# Patient Record
Sex: MALE | Race: OTHER | Employment: FULL TIME | URBAN - METROPOLITAN AREA
[De-identification: names, ages, dates, MRNs, and addresses within clinical notes are randomized per-mention and may not be internally consistent; named-entity substitution may affect disease eponyms.]

---

## 2022-07-10 ENCOUNTER — HOSPITAL ENCOUNTER (INPATIENT)
Age: 42
LOS: 2 days | Discharge: HOME | DRG: 392 | End: 2022-07-12
Attending: EMERGENCY MEDICINE | Admitting: INTERNAL MEDICINE
Payer: COMMERCIAL

## 2022-07-10 ENCOUNTER — APPOINTMENT (OUTPATIENT)
Dept: CT IMAGING | Age: 42
DRG: 392 | End: 2022-07-10
Payer: COMMERCIAL

## 2022-07-10 DIAGNOSIS — K57.20 DIVERTICULITIS OF LARGE INTESTINE WITH PERFORATION WITHOUT ABSCESS OR BLEEDING: Primary | ICD-10-CM

## 2022-07-10 PROBLEM — K57.80 PERFORATED DIVERTICULUM: Status: ACTIVE | Noted: 2022-07-10

## 2022-07-10 LAB
-: ABNORMAL
ABSOLUTE EOS #: 0 K/UL (ref 0–0.4)
ABSOLUTE LYMPH #: 1 K/UL (ref 1–4.8)
ABSOLUTE MONO #: 0.8 K/UL (ref 0.1–1.2)
ALBUMIN SERPL-MCNC: 4.4 G/DL (ref 3.5–5.2)
ALBUMIN/GLOBULIN RATIO: 1.4 (ref 1–2.5)
ALP BLD-CCNC: 80 U/L (ref 40–129)
ALT SERPL-CCNC: 33 U/L (ref 5–41)
ANION GAP SERPL CALCULATED.3IONS-SCNC: 12 MMOL/L (ref 9–17)
AST SERPL-CCNC: 20 U/L
BACTERIA: ABNORMAL
BASOPHILS # BLD: 1 % (ref 0–2)
BASOPHILS ABSOLUTE: 0 K/UL (ref 0–0.2)
BILIRUB SERPL-MCNC: 0.57 MG/DL (ref 0.3–1.2)
BILIRUBIN URINE: NEGATIVE
BUN BLDV-MCNC: 12 MG/DL (ref 6–20)
CALCIUM SERPL-MCNC: 8.7 MG/DL (ref 8.6–10.4)
CHLORIDE BLD-SCNC: 102 MMOL/L (ref 98–107)
CO2: 24 MMOL/L (ref 20–31)
COLOR: YELLOW
CREAT SERPL-MCNC: 0.85 MG/DL (ref 0.7–1.2)
EOSINOPHILS RELATIVE PERCENT: 0 % (ref 1–4)
EPITHELIAL CELLS UA: ABNORMAL /HPF (ref 0–5)
GFR AFRICAN AMERICAN: >60 ML/MIN
GFR NON-AFRICAN AMERICAN: >60 ML/MIN
GFR SERPL CREATININE-BSD FRML MDRD: ABNORMAL ML/MIN/{1.73_M2}
GLUCOSE BLD-MCNC: 159 MG/DL (ref 70–99)
GLUCOSE URINE: NEGATIVE
HCT VFR BLD CALC: 46.1 % (ref 41–53)
HEMOGLOBIN: 16.2 G/DL (ref 13.5–17.5)
KETONES, URINE: NEGATIVE
LACTIC ACID: 1.7 MMOL/L (ref 0.5–2.2)
LEUKOCYTE ESTERASE, URINE: NEGATIVE
LIPASE: 17 U/L (ref 13–60)
LYMPHOCYTES # BLD: 14 % (ref 24–44)
MCH RBC QN AUTO: 31.9 PG (ref 26–34)
MCHC RBC AUTO-ENTMCNC: 35.2 G/DL (ref 31–37)
MCV RBC AUTO: 90.4 FL (ref 80–100)
MONOCYTES # BLD: 10 % (ref 2–11)
MUCUS: ABNORMAL
NITRITE, URINE: NEGATIVE
OTHER OBSERVATIONS UA: ABNORMAL
PDW BLD-RTO: 13.1 % (ref 12.5–15.4)
PH UA: 6 (ref 5–8)
PLATELET # BLD: 160 K/UL (ref 140–450)
PMV BLD AUTO: 8.3 FL (ref 6–12)
POTASSIUM SERPL-SCNC: 3.7 MMOL/L (ref 3.7–5.3)
PROTEIN UA: ABNORMAL
RBC # BLD: 5.1 M/UL (ref 4.5–5.9)
RBC UA: ABNORMAL /HPF (ref 0–2)
SEG NEUTROPHILS: 75 % (ref 36–66)
SEGMENTED NEUTROPHILS ABSOLUTE COUNT: 5.8 K/UL (ref 1.8–7.7)
SODIUM BLD-SCNC: 138 MMOL/L (ref 135–144)
SPECIFIC GRAVITY UA: 1.03 (ref 1–1.03)
TOTAL PROTEIN: 7.5 G/DL (ref 6.4–8.3)
TURBIDITY: CLEAR
URINE HGB: ABNORMAL
UROBILINOGEN, URINE: NORMAL
WBC # BLD: 7.7 K/UL (ref 3.5–11)
WBC UA: ABNORMAL /HPF (ref 0–5)

## 2022-07-10 PROCEDURE — 96375 TX/PRO/DX INJ NEW DRUG ADDON: CPT

## 2022-07-10 PROCEDURE — 83690 ASSAY OF LIPASE: CPT

## 2022-07-10 PROCEDURE — 2580000003 HC RX 258: Performed by: PHYSICIAN ASSISTANT

## 2022-07-10 PROCEDURE — 85025 COMPLETE CBC W/AUTO DIFF WBC: CPT

## 2022-07-10 PROCEDURE — 2500000003 HC RX 250 WO HCPCS: Performed by: PHYSICIAN ASSISTANT

## 2022-07-10 PROCEDURE — 99285 EMERGENCY DEPT VISIT HI MDM: CPT

## 2022-07-10 PROCEDURE — 6360000002 HC RX W HCPCS: Performed by: PHYSICIAN ASSISTANT

## 2022-07-10 PROCEDURE — 6370000000 HC RX 637 (ALT 250 FOR IP): Performed by: NURSE PRACTITIONER

## 2022-07-10 PROCEDURE — 81001 URINALYSIS AUTO W/SCOPE: CPT

## 2022-07-10 PROCEDURE — 83605 ASSAY OF LACTIC ACID: CPT

## 2022-07-10 PROCEDURE — 36415 COLL VENOUS BLD VENIPUNCTURE: CPT

## 2022-07-10 PROCEDURE — 96374 THER/PROPH/DIAG INJ IV PUSH: CPT

## 2022-07-10 PROCEDURE — 2580000003 HC RX 258: Performed by: EMERGENCY MEDICINE

## 2022-07-10 PROCEDURE — 87040 BLOOD CULTURE FOR BACTERIA: CPT

## 2022-07-10 PROCEDURE — 80053 COMPREHEN METABOLIC PANEL: CPT

## 2022-07-10 PROCEDURE — 74177 CT ABD & PELVIS W/CONTRAST: CPT

## 2022-07-10 PROCEDURE — 99222 1ST HOSP IP/OBS MODERATE 55: CPT | Performed by: SURGERY

## 2022-07-10 PROCEDURE — 1210000000 HC MED SURG R&B

## 2022-07-10 PROCEDURE — 6360000004 HC RX CONTRAST MEDICATION: Performed by: EMERGENCY MEDICINE

## 2022-07-10 RX ORDER — METRONIDAZOLE 500 MG/100ML
500 INJECTION, SOLUTION INTRAVENOUS ONCE
Status: COMPLETED | OUTPATIENT
Start: 2022-07-10 | End: 2022-07-10

## 2022-07-10 RX ORDER — PSEUDOEPHEDRINE HCL 30 MG
100 TABLET ORAL 2 TIMES DAILY
Status: ON HOLD | COMMUNITY
End: 2022-07-10

## 2022-07-10 RX ORDER — ONDANSETRON 2 MG/ML
4 INJECTION INTRAMUSCULAR; INTRAVENOUS ONCE
Status: COMPLETED | OUTPATIENT
Start: 2022-07-10 | End: 2022-07-10

## 2022-07-10 RX ORDER — LEVOFLOXACIN 5 MG/ML
750 INJECTION, SOLUTION INTRAVENOUS ONCE
Status: COMPLETED | OUTPATIENT
Start: 2022-07-10 | End: 2022-07-10

## 2022-07-10 RX ORDER — ACETAMINOPHEN 650 MG/1
650 SUPPOSITORY RECTAL EVERY 6 HOURS PRN
Status: DISCONTINUED | OUTPATIENT
Start: 2022-07-10 | End: 2022-07-12 | Stop reason: HOSPADM

## 2022-07-10 RX ORDER — SODIUM CHLORIDE 9 MG/ML
INJECTION, SOLUTION INTRAVENOUS PRN
Status: DISCONTINUED | OUTPATIENT
Start: 2022-07-10 | End: 2022-07-12 | Stop reason: HOSPADM

## 2022-07-10 RX ORDER — ONDANSETRON 4 MG/1
4 TABLET, ORALLY DISINTEGRATING ORAL EVERY 8 HOURS PRN
Status: DISCONTINUED | OUTPATIENT
Start: 2022-07-10 | End: 2022-07-12 | Stop reason: HOSPADM

## 2022-07-10 RX ORDER — SODIUM CHLORIDE 0.9 % (FLUSH) 0.9 %
5-40 SYRINGE (ML) INJECTION EVERY 12 HOURS SCHEDULED
Status: DISCONTINUED | OUTPATIENT
Start: 2022-07-10 | End: 2022-07-12 | Stop reason: HOSPADM

## 2022-07-10 RX ORDER — SODIUM CHLORIDE 0.9 % (FLUSH) 0.9 %
10 SYRINGE (ML) INJECTION PRN
Status: DISCONTINUED | OUTPATIENT
Start: 2022-07-10 | End: 2022-07-12 | Stop reason: HOSPADM

## 2022-07-10 RX ORDER — 0.9 % SODIUM CHLORIDE 0.9 %
80 INTRAVENOUS SOLUTION INTRAVENOUS ONCE
Status: DISCONTINUED | OUTPATIENT
Start: 2022-07-10 | End: 2022-07-12 | Stop reason: HOSPADM

## 2022-07-10 RX ORDER — ONDANSETRON 2 MG/ML
4 INJECTION INTRAMUSCULAR; INTRAVENOUS EVERY 6 HOURS PRN
Status: DISCONTINUED | OUTPATIENT
Start: 2022-07-10 | End: 2022-07-12 | Stop reason: HOSPADM

## 2022-07-10 RX ORDER — POLYETHYLENE GLYCOL 3350 17 G/17G
17 POWDER, FOR SOLUTION ORAL DAILY PRN
Status: DISCONTINUED | OUTPATIENT
Start: 2022-07-10 | End: 2022-07-12 | Stop reason: HOSPADM

## 2022-07-10 RX ORDER — 0.9 % SODIUM CHLORIDE 0.9 %
1000 INTRAVENOUS SOLUTION INTRAVENOUS ONCE
Status: COMPLETED | OUTPATIENT
Start: 2022-07-10 | End: 2022-07-10

## 2022-07-10 RX ORDER — ENOXAPARIN SODIUM 100 MG/ML
40 INJECTION SUBCUTANEOUS DAILY
Status: DISCONTINUED | OUTPATIENT
Start: 2022-07-11 | End: 2022-07-12 | Stop reason: HOSPADM

## 2022-07-10 RX ORDER — ACETAMINOPHEN 325 MG/1
650 TABLET ORAL EVERY 6 HOURS PRN
Status: DISCONTINUED | OUTPATIENT
Start: 2022-07-10 | End: 2022-07-12 | Stop reason: HOSPADM

## 2022-07-10 RX ORDER — SODIUM CHLORIDE 0.9 % (FLUSH) 0.9 %
5-40 SYRINGE (ML) INJECTION PRN
Status: DISCONTINUED | OUTPATIENT
Start: 2022-07-10 | End: 2022-07-12 | Stop reason: HOSPADM

## 2022-07-10 RX ADMIN — SODIUM CHLORIDE, PRESERVATIVE FREE 10 ML: 5 INJECTION INTRAVENOUS at 17:51

## 2022-07-10 RX ADMIN — ONDANSETRON 4 MG: 2 INJECTION INTRAMUSCULAR; INTRAVENOUS at 17:01

## 2022-07-10 RX ADMIN — METRONIDAZOLE 500 MG: 500 INJECTION, SOLUTION INTRAVENOUS at 18:41

## 2022-07-10 RX ADMIN — Medication 80 ML: at 17:51

## 2022-07-10 RX ADMIN — HYDROMORPHONE HYDROCHLORIDE 1 MG: 1 INJECTION, SOLUTION INTRAMUSCULAR; INTRAVENOUS; SUBCUTANEOUS at 17:01

## 2022-07-10 RX ADMIN — HYDROMORPHONE HYDROCHLORIDE 1 MG: 1 INJECTION, SOLUTION INTRAMUSCULAR; INTRAVENOUS; SUBCUTANEOUS at 19:35

## 2022-07-10 RX ADMIN — ACETAMINOPHEN 650 MG: 325 TABLET ORAL at 21:00

## 2022-07-10 RX ADMIN — SODIUM CHLORIDE 1000 ML: 9 INJECTION, SOLUTION INTRAVENOUS at 17:01

## 2022-07-10 RX ADMIN — IOPAMIDOL 75 ML: 755 INJECTION, SOLUTION INTRAVENOUS at 17:51

## 2022-07-10 RX ADMIN — LEVOFLOXACIN 750 MG: 5 INJECTION, SOLUTION INTRAVENOUS at 19:50

## 2022-07-10 ASSESSMENT — PAIN DESCRIPTION - LOCATION
LOCATION: ABDOMEN
LOCATION: ABDOMEN

## 2022-07-10 ASSESSMENT — LIFESTYLE VARIABLES
HOW MANY STANDARD DRINKS CONTAINING ALCOHOL DO YOU HAVE ON A TYPICAL DAY: PATIENT DECLINED
HOW OFTEN DO YOU HAVE A DRINK CONTAINING ALCOHOL: MONTHLY OR LESS

## 2022-07-10 ASSESSMENT — PAIN DESCRIPTION - ORIENTATION
ORIENTATION: LOWER
ORIENTATION: LEFT;LOWER

## 2022-07-10 ASSESSMENT — PAIN - FUNCTIONAL ASSESSMENT
PAIN_FUNCTIONAL_ASSESSMENT: PREVENTS OR INTERFERES SOME ACTIVE ACTIVITIES AND ADLS
PAIN_FUNCTIONAL_ASSESSMENT: 0-10

## 2022-07-10 ASSESSMENT — PAIN SCALES - GENERAL
PAINLEVEL_OUTOF10: 8
PAINLEVEL_OUTOF10: 6
PAINLEVEL_OUTOF10: 10

## 2022-07-10 NOTE — ED PROVIDER NOTES
Emergency Department           COMPLAINT       Chief Complaint   Patient presents with    Abdominal Pain     LLQ pain radiates to rightside, started friday night, vomited in shower that night but now intermittent nausea/vomiting, took tylenol and had full BM firday morning but has had some stool passed, denies blood in stool       PHYSICAL EXAM      ED Triage Vitals [07/10/22 1645]   BP Temp Temp Source Heart Rate Resp SpO2 Height Weight   128/88 98.5 °F (36.9 °C) Oral (!) 108 16 97 % 5' 8\" (1.727 m) 197 lb (89.4 kg)         Constitutional: Alert, oriented x3, nontoxic, answering questions appropriately, acting properly for age, in no acute distress   HEENT: Extraocular muscles intact,  Neck: Trachea midline   Cardiovascular: Regular rhythm and rate no murmurs   Respiratory: Clear to auscultation bilaterally no wheezes, rhonchi, rales, no respiratory distress no tachypnea no retractions no hypoxia  Gastrointestinal: Soft, moderate left lower quadrant tenderness to palpation, nondistended, diminished bowel sounds. No rebound, rigidity, or guarding.    Musculoskeletal: No extremity pain or swelling   Neurologic: Moving all 4 extremities without difficulty there are no gross focal neurologic deficits   Skin: Warm and dry       Physical Exam  DIAGNOSTIC RESULTS     EKG: All EKG's are interpreted by the Emergency Department Physician who either signs or Co-signs this chart in the absence of a cardiologist.    Not indicated unless otherwise documented above or in the midlevel documentation    LABS:  Results for orders placed or performed during the hospital encounter of 07/10/22   CBC with Auto Differential   Result Value Ref Range    WBC 7.7 3.5 - 11.0 k/uL    RBC 5.10 4.5 - 5.9 m/uL    Hemoglobin 16.2 13.5 - 17.5 g/dL    Hematocrit 46.1 41 - 53 %    MCV 90.4 80 - 100 fL    MCH 31.9 26 - 34 pg    MCHC 35.2 31 - 37 g/dL    RDW 13.1 12.5 - 15.4 %    Platelets 140 223 - 231 k/uL    MPV 8.3 6.0 - 12.0 fL    Seg method to exclude a potential uropathogen can be unreliable in many patient populations. Rapid screening tests are less sensitive than culture and if UTI is a clinical possibility, culture should be considered despite a negative urinalysis. Not indicated unless otherwise documented above or in the midlevel documentation    RADIOLOGY:   I reviewedthe radiologist interpretations:  CT ABDOMEN PELVIS W IV CONTRAST Additional Contrast? None   Preliminary Result   Acute diverticulitis of the distal descending/proximal sigmoid colon,   complicated by perforation, with scattered foci of intraperitoneal free air. No well-defined drainable abscess is seen. Hepatic steatosis. Not indicated unless otherwise documented above or in the midlevel documentation    EMERGENCY DEPARTMENT COURSE:       PERTINENT ATTENDING PHYSICIAN COMMENTS:    Sent from urgent care to rule out diverticulitis. Left lower quadrant abdominal pain. No fevers or chills. History of appendectomy. IV fluids and labs. Pain control. CT abdomen and pelvis. 6:15 PM CAT scan shows diverticulitis with perforation scattered foci of intraperitoneal air. No abscess. Will discuss the case with general surgery and admit. We will start IV antibiotics. 6:45 PM evaluated by general surgery will admit. Faculty Attestation    I performed a history and physical examination of the patient and discussed management with the mid level provideer. I reviewed the mid level provider's note and agree with the documented findings and plan of care. Any areas of disagreement are noted on the chart. I was personally present for the key portions of any procedures. I have documented in the chart those procedures where I was not present during the key portions. I have reviewed the emergency nurses triage note.  I agree with the chief complaint, past medical history, past surgical history, allergies, medications, social and family history as documented unless otherwise noted below. Documentation of the HPI, Physical Exam and Medical Decision Making performed by medical students or scribes is based on my personal performance of the HPI, PE and MDM. For Physician Assistant/ Nurse Practitioner cases/documentation I have personally evaluated this patient and have completed at least one if not all key elements of the E/M (history, physical exam, and MDM). Additional findings are as noted.        Herve Collier,   07/10/22 9897

## 2022-07-10 NOTE — CONSULTS
[address]      Patient's Name/ Date of Birth/ Gender: Elvis Sandoval / 1980 (38 y.o.) / male     Referring Physician: Nathan Monroy DO    Consulting Physician: Dr. Feng Para     CC: perforated L colon diverticulitis    History of present Illness: Elvis Sandoval is a 38 y.o. male, presented to ED with worsening LLQ abdominal pain, no prior history of this. CT abd/pelv obtained significant for perforated diverticulitis. [address] colon CA in his father diagnosed age 41, pt has not had colonoscopy yet (scheduled for later this month)    Past Medical History:  has no past medical history on file. Past Surgical History:   Past Surgical History:   Procedure Laterality Date    APPENDECTOMY         Social History:  reports that he has never smoked. He does not have any smokeless tobacco history on file. He reports current alcohol use. He reports that he does not use drugs. Family History: family history is not on file. Review of Systems:   General: Denies fever, chills, night sweats, weight loss, malaise, fatigue  HEENT: Denies sore throat, sinus problems, allergic rhinosinusitis  Card: Denies chest pain, palpitations, orthopnea/PND. Denies h/o murmurs  Pulm: Denies cough, shortness of breath, BRENNAN  GI:  LLQ pain    : Denies polyuria, dysuria, hematuria  Endo: Denies diabetes, thyroid problems. Heme: Denies anemia, h/o bleeding or clotting problems. Neuro: Denies h/o CVA, TIA  Skin: Denies rashes, ulcers  Musculoskeletal: Denies muscle, joint, back pain. Allergies: Patient has no known allergies.     Current Meds:  Current Facility-Administered Medications:     sodium chloride flush 0.9 % injection 10 mL, 10 mL, IntraVENous, PRN, Simon Serna DO, 10 mL at 07/10/22 1751    0.9 % sodium chloride bolus, 80 mL, IntraVENous, Once, Simon Serna DO    metronidazole (FLAGYL) 500 mg in 0.9% NaCl 100 mL IVPB premix, 500 mg, IntraVENous, Once, [employer], PA-C, Last 0. 57   LIPASE 17     Coagulation:   Recent Labs     07/10/22  1654   PROT 7.5         Imaging:  CT ABDOMEN PELVIS W IV CONTRAST Additional Contrast? None    Result Date: 7/10/2022  EXAMINATION: CT OF THE ABDOMEN AND PELVIS WITH CONTRAST 7/10/2022 5:02 pm TECHNIQUE: CT of the abdomen and pelvis was performed with the administration of intravenous contrast. Multiplanar reformatted images are provided for review. Automated exposure control, iterative reconstruction, and/or weight based adjustment of the mA/kV was utilized to reduce the radiation dose to as low as reasonably achievable. COMPARISON: None. HISTORY: ORDERING SYSTEM PROVIDED HISTORY: [facility] pain TECHNOLOGIST PROVIDED HISTORY: Q pain Decision Support Exception - unselect if not a suspected or confirmed emergency medical condition->Emergency Medical Condition (MA) Reason for Exam: LLQ pain x 3 days - emesis Relevant Medical/Surgical History: hx of appendectomy FINDINGS: Lower Chest: Visualized portion of the lower chest demonstrates no acute abnormality. Organs: The liver, gallbladder, spleen, pancreas, adrenal glands, and kidneys demonstrate no acute abnormality. Diffuse low-attenuation of the liver compatible with hepatic steatosis. GI/Bowel: Colonic diverticulosis, with inflammatory changes about the distal descending/proximal sigmoid colon, compatible with acute diverticulitis. This is complicated by perforation, as there is intraperitoneal free air. No well-defined drainable abscess is seen. No bowel obstruction. Prior appendectomy per provided clinical history. Pelvis: Urinary bladder is unremarkable. Peritoneum/Retroperitoneum: No lymphadenopathy is seen. There are scattered foci of intraperitoneal free air. No significant free fluid is noted. Bones/Soft Tissues: No acute bony abnormality is seen. Acute diverticulitis of the distal descending/proximal sigmoid colon, complicated by perforation, with scattered foci of intraperitoneal free air. No well-defined drainable abscess is seen. Hepatic steatosis. Assessment:    Bunny Monroe is a 38 y.o. male with     Acute perforated L colon diverticulitis    Plan:    1. Discussed with patient, recommend antibiotics and bowel rest at this point, ok for ice chips for comfort and sips with meds. If continues to improve then we can discuss outpt colonoscopy and details of elective colectomy. We also discussed the possibility of emergency colectomy and colostomy if failure to improve on antibiotics. All questions were answered, pt is agreeable to this plan.   2. Discussed with ED staff      Sofia Bai DO  7/10/2022

## 2022-07-10 NOTE — ED PROVIDER NOTES
[id number] [facility]  [address] [facility] [place] [address]  Phone: [phone]  Fax: [phone] [facility]      Pt Name: Tadeo Contreras  MRN: [id number]  Shelley 1980  Date of evaluation: 7/10/22      CHIEF COMPLAINT:  Chief Complaint   Patient presents with    Abdominal Pain     LLQ pain radiates to rightside, started friday night, vomited in shower that night but now intermittent nausea/vomiting, took tylenol and had full BM firday morning but has had some stool passed, denies blood in stool        Sera Saleh [address] Ponce is a 38 y.o. male who presents with GI complaints:       Timing/Onset: Patient here for evaluation of significant left lower quadrant pain and just left lower abdominal pain in general is been going on since Friday. Initially this was associated with some vomiting which is only been intermittent since that time with attempted oral intake of solid foods. Patient's had no vomiting today and he has been able to tolerate some fluids. He states he did have a fever of 100.4 yesterday. Patient took some Motrin earlier today about 11:00. No associated trauma or any chest/respiratory/back/urinary pain. There is no radiation of pain to his chest back or genitals/groin. Patient states that he did have a bowel movement on Friday which was a little smaller and harder in nature. He has had no other bowel movements since that time. He has had no hematuria. Remote history of appendectomy. He denies any history of kidney stones or any other urology/renal history. Context/Setting: As above    Quality: sharp   achy    Duration:  Constant   Modifying Factors:  Post-prandial      Severity: moderate        Nursing Notes were reviewed. REVIEW OF SYSTEMS       Constitutional: per HPI  Eyes: No visual changes. Neck: No neck pain. Respiratory: Denies recent shortness of breath.     Cardiac:  Denies recent chest pain or palpitations  GI: Per HPI  : Per HPI    Musculoskeletal: per HPI   Neurologic: Denies headache or focal weakness. Skin:  Denies any rash. Negative in 10 essential Systems except as mentioned above and in the HPI. PAST MEDICAL HISTORY   PMH:  has no past medical history on file. Surgical History:  has a past surgical history that includes Appendectomy. Social History:  reports that he has never smoked. He does not have any smokeless tobacco history on file. He reports current alcohol use. He reports that he does not use drugs. Family History: None  Psychiatric History: None    Allergies:has No Known Allergies. PHYSICAL EXAM     INITIAL VITALS: /82   Pulse 89   Temp 98.5 °F (36.9 °C) (Oral)   Resp 16   Ht 5' 8\" (1.727 m)   Wt 89.4 kg (197 lb)   SpO2 96%   BMI 29.95 kg/m²   Constitutional:  Well developed, nontoxic but pain distress/malase. Eyes:  Pupils equal/round  HENT:  Atraumatic, external ears normal, nose normal, oropharynx moist.   Respiratory:  Clear to auscultation bilaterally with good air exchange, no W/R/R  Cardiovascular:  RRR with normal S1 and S2  Gastrointestinal/Abdomen:  Soft, significant LLQ > midline/right lower abdominal tenderness to palpation with some guarding. No overt signs of generalized peritonitis. hypoactive BS present. No mass appreciated. Musculoskeletal:  Normal to inspection  Back:  No midline/paraspinal TTP. No bilat CVA/flank TTP. Integument:  No rash.     Neurologic:  Alert & age appropriate mentation-interaction      DIAGNOSTIC RESULTS     EKG: All EKG's are interpreted by the Emergency Department Physician who either signs or Co-signs this chart in the absence of a cardiologist.    Not indicated    RADIOLOGY:   Reviewed the radiologist:  CT ABDOMEN PELVIS W IV CONTRAST Additional Contrast? None   Preliminary Result   Acute diverticulitis of the distal descending/proximal sigmoid colon,   complicated by perforation, with scattered foci of intraperitoneal free air. No well-defined drainable abscess is seen. Hepatic steatosis. LABS:  Labs Reviewed   CBC WITH AUTO DIFFERENTIAL - Abnormal; Notable for the following components:       Result Value    Seg Neutrophils 75 (*)     Lymphocytes 14 (*)     Eosinophils % 0 (*)     All other components within normal limits   COMPREHENSIVE METABOLIC PANEL W/ REFLEX TO MG FOR LOW K - Abnormal; Notable for the following components:    Glucose 159 (*)     All other components within normal limits   URINALYSIS WITH REFLEX TO CULTURE - Abnormal; Notable for the following components:    Urine Hgb TRACE (*)     Protein, UA TRACE (*)     All other components within normal limits   MICROSCOPIC URINALYSIS - Abnormal; Notable for the following components:    Bacteria, UA FEW (*)     Mucus, UA 2+ (*)     Other Observations UA   (*)     Value: Utilizing a urinalysis as the only screening method to exclude a potential uropathogen can be unreliable in many patient populations. Rapid screening tests are less sensitive than culture and if UTI is a clinical possibility, culture should be considered despite a negative urinalysis. All other components within normal limits   CULTURE, BLOOD 1   CULTURE, BLOOD 1   LIPASE   LACTIC ACID         EMERGENCY DEPARTMENT COURSE, DDX and MDM:     1700  CT and abd labs ordered. Pt is nontoxic but he has significant tenderness of lower abdomen and exhibiting some pain distress. IVF/Dilaudid/Zofran ordered. 1819  Cultures and IV abx ordered. Dr Echavarria/Gen Surg paged with CT results of perforated diverticulitis. 1944  [facility], discussed with JORGE Rasmussen/STEVO. Dr Renee Latham evaluated pt here and nonsurgical plan at this time.      Orders Placed This Encounter   Medications    HYDROmorphone (DILAUDID) injection 1 mg    ondansetron (ZOFRAN) injection 4 mg    0.9 % sodium chloride bolus    sodium chloride flush 0.9 % injection 10 mL    0.9 % sodium chloride bolus    iopamidol (ISOVUE-370) 76 % injection 75 mL    DISCONTD: piperacillin-tazobactam (ZOSYN) 3,375 mg in dextrose 5 % 50 mL IVPB (mini-bag)     Order Specific Question:   Antimicrobial Indications     Answer:   Intra-Abdominal Infection    metronidazole (FLAGYL) 500 mg in 0.9% NaCl 100 mL IVPB premix     Order Specific Question:   Antimicrobial Indications     Answer:   Intra-Abdominal Infection    levoFLOXacin (LEVAQUIN) 750 MG/150ML infusion 750 mg     Order Specific Question:   Antimicrobial Indications     Answer:   Intra-Abdominal Infection    HYDROmorphone (DILAUDID) injection 1 mg       CONSULTS:  IP CONSULT TO GENERAL SURGERY      FINAL IMPRESSION      1. Diverticulitis of large intestine with perforation without abscess or bleeding          DISPOSITION/PLAN:  DISPOSITION Admitted 07/10/2022 07:39:19 PM        PATIENT REFERRED TO:  No follow-up provider specified.     DISCHARGE MEDICATIONS:  New Prescriptions    No medications on file       (Please note that portions of this note were completed with a voice recognition program.  Efforts were made to edit the dictations but occasionally words are mis-transcribed.)    WALI Thomas PA-C  07/10/22 1946

## 2022-07-10 NOTE — LETTER
[facility] [address] ICU  [address] [facility] [address]  Phone: [phone]    No name on file. July 12, 2022     Patient: Danielle Watters   YOB: 1980   Date of Visit: 7/10/2022       To Whom It May Concern: It is my medical opinion that Luz Elena Adler may return to work on 7/13/22. He was seen at this facility from 7/10/22-7/12/22    If you have any questions or concerns, please don't hesitate to call.     Sincerely,        Jose Aquino RN

## 2022-07-11 LAB
ABSOLUTE EOS #: 0 K/UL (ref 0–0.4)
ABSOLUTE LYMPH #: 1.4 K/UL (ref 1–4.8)
ABSOLUTE MONO #: 0.9 K/UL (ref 0.1–1.2)
ANION GAP SERPL CALCULATED.3IONS-SCNC: 9 MMOL/L (ref 9–17)
BASOPHILS # BLD: 0 % (ref 0–2)
BASOPHILS ABSOLUTE: 0 K/UL (ref 0–0.2)
BUN BLDV-MCNC: 11 MG/DL (ref 6–20)
CALCIUM SERPL-MCNC: 8.5 MG/DL (ref 8.6–10.4)
CHLORIDE BLD-SCNC: 104 MMOL/L (ref 98–107)
CO2: 27 MMOL/L (ref 20–31)
CREAT SERPL-MCNC: 0.84 MG/DL (ref 0.7–1.2)
EOSINOPHILS RELATIVE PERCENT: 0 % (ref 1–4)
GFR AFRICAN AMERICAN: >60 ML/MIN
GFR NON-AFRICAN AMERICAN: >60 ML/MIN
GFR SERPL CREATININE-BSD FRML MDRD: ABNORMAL ML/MIN/{1.73_M2}
GLUCOSE BLD-MCNC: 103 MG/DL (ref 70–99)
HCT VFR BLD CALC: 43.2 % (ref 41–53)
HEMOGLOBIN: 15 G/DL (ref 13.5–17.5)
LYMPHOCYTES # BLD: 21 % (ref 24–44)
MCH RBC QN AUTO: 31.6 PG (ref 26–34)
MCHC RBC AUTO-ENTMCNC: 34.7 G/DL (ref 31–37)
MCV RBC AUTO: 91 FL (ref 80–100)
MONOCYTES # BLD: 13 % (ref 2–11)
PDW BLD-RTO: 12.9 % (ref 12.5–15.4)
PLATELET # BLD: 139 K/UL (ref 140–450)
PMV BLD AUTO: 8.2 FL (ref 6–12)
POTASSIUM SERPL-SCNC: 3.8 MMOL/L (ref 3.7–5.3)
RBC # BLD: 4.75 M/UL (ref 4.5–5.9)
SEG NEUTROPHILS: 66 % (ref 36–66)
SEGMENTED NEUTROPHILS ABSOLUTE COUNT: 4.5 K/UL (ref 1.8–7.7)
SODIUM BLD-SCNC: 140 MMOL/L (ref 135–144)
WBC # BLD: 6.9 K/UL (ref 3.5–11)

## 2022-07-11 PROCEDURE — 85025 COMPLETE CBC W/AUTO DIFF WBC: CPT

## 2022-07-11 PROCEDURE — 2580000003 HC RX 258: Performed by: HOSPITALIST

## 2022-07-11 PROCEDURE — 80048 BASIC METABOLIC PNL TOTAL CA: CPT

## 2022-07-11 PROCEDURE — 1210000000 HC MED SURG R&B

## 2022-07-11 PROCEDURE — 99232 SBSQ HOSP IP/OBS MODERATE 35: CPT | Performed by: HOSPITALIST

## 2022-07-11 PROCEDURE — 6360000002 HC RX W HCPCS: Performed by: NURSE PRACTITIONER

## 2022-07-11 PROCEDURE — 2500000003 HC RX 250 WO HCPCS: Performed by: HOSPITALIST

## 2022-07-11 PROCEDURE — 2580000003 HC RX 258: Performed by: NURSE PRACTITIONER

## 2022-07-11 PROCEDURE — 36415 COLL VENOUS BLD VENIPUNCTURE: CPT

## 2022-07-11 PROCEDURE — 6360000002 HC RX W HCPCS: Performed by: HOSPITALIST

## 2022-07-11 RX ORDER — LEVOFLOXACIN 5 MG/ML
750 INJECTION, SOLUTION INTRAVENOUS EVERY 24 HOURS
Status: DISCONTINUED | OUTPATIENT
Start: 2022-07-11 | End: 2022-07-12 | Stop reason: HOSPADM

## 2022-07-11 RX ORDER — METRONIDAZOLE 500 MG/100ML
500 INJECTION, SOLUTION INTRAVENOUS EVERY 8 HOURS
Status: DISCONTINUED | OUTPATIENT
Start: 2022-07-11 | End: 2022-07-12 | Stop reason: HOSPADM

## 2022-07-11 RX ORDER — SODIUM CHLORIDE 9 MG/ML
INJECTION, SOLUTION INTRAVENOUS CONTINUOUS
Status: DISCONTINUED | OUTPATIENT
Start: 2022-07-11 | End: 2022-07-12 | Stop reason: HOSPADM

## 2022-07-11 RX ADMIN — HYDROMORPHONE HYDROCHLORIDE 1 MG: 1 INJECTION, SOLUTION INTRAMUSCULAR; INTRAVENOUS; SUBCUTANEOUS at 05:38

## 2022-07-11 RX ADMIN — METRONIDAZOLE 500 MG: 500 INJECTION, SOLUTION INTRAVENOUS at 09:07

## 2022-07-11 RX ADMIN — METRONIDAZOLE 500 MG: 500 INJECTION, SOLUTION INTRAVENOUS at 17:16

## 2022-07-11 RX ADMIN — SODIUM CHLORIDE: 9 INJECTION, SOLUTION INTRAVENOUS at 09:05

## 2022-07-11 RX ADMIN — HYDROMORPHONE HYDROCHLORIDE 1 MG: 1 INJECTION, SOLUTION INTRAMUSCULAR; INTRAVENOUS; SUBCUTANEOUS at 11:42

## 2022-07-11 RX ADMIN — HYDROMORPHONE HYDROCHLORIDE 1 MG: 1 INJECTION, SOLUTION INTRAMUSCULAR; INTRAVENOUS; SUBCUTANEOUS at 23:15

## 2022-07-11 RX ADMIN — HYDROMORPHONE HYDROCHLORIDE 1 MG: 1 INJECTION, SOLUTION INTRAMUSCULAR; INTRAVENOUS; SUBCUTANEOUS at 18:16

## 2022-07-11 RX ADMIN — SODIUM CHLORIDE, PRESERVATIVE FREE 10 ML: 5 INJECTION INTRAVENOUS at 09:05

## 2022-07-11 RX ADMIN — HYDROMORPHONE HYDROCHLORIDE 1 MG: 1 INJECTION, SOLUTION INTRAMUSCULAR; INTRAVENOUS; SUBCUTANEOUS at 00:08

## 2022-07-11 RX ADMIN — LEVOFLOXACIN 750 MG: 5 INJECTION, SOLUTION INTRAVENOUS at 18:22

## 2022-07-11 ASSESSMENT — PAIN SCALES - GENERAL
PAINLEVEL_OUTOF10: 5
PAINLEVEL_OUTOF10: 9
PAINLEVEL_OUTOF10: 8
PAINLEVEL_OUTOF10: 4
PAINLEVEL_OUTOF10: 8
PAINLEVEL_OUTOF10: 8
PAINLEVEL_OUTOF10: 9

## 2022-07-11 ASSESSMENT — PAIN DESCRIPTION - DESCRIPTORS
DESCRIPTORS: DISCOMFORT
DESCRIPTORS: STABBING

## 2022-07-11 ASSESSMENT — PAIN DESCRIPTION - ORIENTATION
ORIENTATION: LEFT

## 2022-07-11 ASSESSMENT — PAIN DESCRIPTION - LOCATION
LOCATION: ABDOMEN

## 2022-07-11 ASSESSMENT — PAIN DESCRIPTION - PAIN TYPE: TYPE: ACUTE PAIN

## 2022-07-11 NOTE — H&P
[facility]  Office: [address], DO, Izabel Burr, DO, Dustin Aviles, DO, Jaja Best, DO, Artemio Napier MD, Mai Olivares MD, Lakisha Israel MD, Anthony Bishop MD, Ashley Alex MD, Paramjit Yañez MD, Cali Cabello MD, Delma Draper, DO, Yi Corey MD,  George Duran MD, Magdalene Elizalde MD, Nereida Drew DO, Aris Bui MD, Sara Barnett MD, Dorina Lawrence DO, Anders Collier MD, Prakash Rubi MD, Adela Britt, [facility], [facility], CNP, Corinne Dennis, CNP, Cecilia Galaviz, CNP, Horace Escamilla, [facility], Isaias Hurst, [facility], Isamar Villanueva PAEnriquetaC, Rhina Nelson, DNP, Alejandro Aguiar, CNP, Ct Greenberg, CNP, Wilbert Angeles, CNP, Salvatore Garcia, CNS, Farshad Lanza, [facility], Juancarlos Max, CNP, Mayra Magdaleno, CNP, Lizandro Granados, [facility]         [address] [facility]    HISTORY AND PHYSICAL EXAMINATION            Date:   7/11/2022  Patient name:  Alberto Montanez  Date of admission:  7/10/2022  4:38 PM  MRN:   [id number]  Account:  [de-identified]  YOB: 1980  PCP:    Lia Leiva MD  Room:   [address] [place] [address]  Code Status:    Full Code    Chief Complaint:     Chief Complaint   Patient presents with    Abdominal Pain     LLQ pain radiates to rightside, started friday night, vomited in shower that night but now intermittent nausea/vomiting, took tylenol and had full BM firday morning but has had some stool passed, denies blood in stool      Patient seen in follow-up for abdominal pain secondary to acute diverticulitis with perforation, patient states \"my belly hurts but I feel better\"    History Obtained From:     patient, electronic medical record    History of Present Illness:     Alberto Montanez is a 38 y.o.   /  male who presents with Abdominal Pain (LLQ pain radiates to rightside, started friday night, vomited in shower that night but now intermittent nausea/vomiting, took tylenol and had full BM firday morning but has had some stool passed, denies blood in stool )   and is admitted to the hospital for the management of Perforated diverticulum. This very pleasant 70-year-old male presented to the hospital with abdominal pain. He originally developed abdominal pain late Friday evening. He felt that he simply had some sort of gastroenteritis. He did have abdominal pain, fevers along with nausea and vomiting. This symptoms wax and waned and he did not immediately seek medical treatment. It progressively got worse and he presented to the hospital yesterday evening where imaging studies have shown an acute diverticulitis with perforation. At this point in time he has been admitted and initiated on IV antibiotics. He received his first dose of Levaquin and Flagyl in the emergency room. I have reordered these medications accordingly. His pain is under control with Dilaudid. I am going to start him on IV fluids as he is n.p.o. and I will allow him ice chips. He has met with surgery and he understands that ultimately once his acute illness has improved and the bowel has \"cooled off\" he will require colonoscopy and on the elective laparoscopic resection of the affected bowel. Multiple questions answered regarding this. He denies any questions or concerns at this point in time. Past Medical History:     History reviewed. No pertinent past medical history. Past Surgical History:     Past Surgical History:   Procedure Laterality Date    APPENDECTOMY          Medications Prior to Admission:     Prior to Admission medications    Not on File        Allergies:     Patient has no known allergies. Social History:     Tobacco:    reports that he has never smoked. He has never used smokeless tobacco.  Alcohol:      reports current alcohol use. Drug Use:  reports no history of drug use.     Family History:     Family History   Family history unknown: Yes       Review of Systems:     Positive and Negative as described in HPI. CONSTITUTIONAL: For fevers  HEENT:  negative for vision, hearing changes, runny nose, throat pain  RESPIRATORY:  negative for shortness of breath, cough, congestion, wheezing  CARDIOVASCULAR:  negative for chest pain, palpitations  GASTROINTESTINAL: Positive for nausea, vomiting, abdominal pain  GENITOURINARY:  negative for difficulty of urination, burning with urination, frequency   INTEGUMENT:  negative for rash, skin lesions, easy bruising   HEMATOLOGIC/LYMPHATIC:  negative for swelling/edema   ALLERGIC/IMMUNOLOGIC:  negative for urticaria , itching  ENDOCRINE:  negative increase in drinking, increase in urination, hot or cold intolerance  MUSCULOSKELETAL:  negative joint pains, muscle aches, swelling of joints  NEUROLOGICAL:  negative for headaches, dizziness, lightheadedness, numbness, pain, tingling extremities  BEHAVIOR/PSYCH:  negative for depression, anxiety    Physical Exam:   /65   Pulse 78   Temp 98.4 °F (36.9 °C) (Oral)   Resp 14   Ht 5' 8\" (1.727 m)   Wt 199 lb 15.3 oz (90.7 kg)   SpO2 95%   BMI 30.40 kg/m²   Temp (24hrs), Av.9 °F (37.2 °C), Min:98.4 °F (36.9 °C), Max:100.6 °F (38.1 °C)    No results for input(s): POCGLU in the last 72 hours.   No intake or output data in the 24 hours ending 22 0912    General Appearance:  alert, well appearing, and in no acute distress  Mental status: oriented to person, place, and time  Head:  normocephalic, atraumatic  Eye: no icterus, redness, pupils equal and reactive, extraocular eye movements intact, conjunctiva clear  Ear: normal external ear, no discharge, hearing intact  Nose:  no drainage noted  Mouth: mucous membranes moist  Neck: supple, no carotid bruits, thyroid not palpable  Lungs: Bilateral equal air entry, clear to ausculation, no wheezing, rales or rhonchi, normal effort  Cardiovascular: normal rate, regular rhythm, no murmur, gallop, rub  Abdomen: Soft, tenderness in the left lower quadrant, nondistended, normal bowel sounds, no hepatomegaly or splenomegaly  Neurologic: There are no new focal motor or sensory deficits, normal muscle tone and bulk, no abnormal sensation, normal speech, cranial nerves II through XII grossly intact  Skin: No gross lesions, rashes, bruising or bleeding on exposed skin area  Extremities:  peripheral pulses palpable, no pedal edema or calf pain with palpation  Psych: normal affect     Investigations:      Laboratory Testing:  Recent Results (from the past 24 hour(s))   CBC with Auto Differential    Collection Time: 07/10/22  4:54 PM   Result Value Ref Range    WBC 7.7 3.5 - 11.0 k/uL    RBC 5.10 4.5 - 5.9 m/uL    Hemoglobin 16.2 13.5 - 17.5 g/dL    Hematocrit 46.1 41 - 53 %    MCV 90.4 80 - 100 fL    MCH 31.9 26 - 34 pg    MCHC 35.2 31 - 37 g/dL    RDW 13.1 12.5 - 15.4 %    Platelets 478 321 - 552 k/uL    MPV 8.3 6.0 - 12.0 fL    Seg Neutrophils 75 (H) 36 - 66 %    Lymphocytes 14 (L) 24 - 44 %    Monocytes 10 2 - 11 %    Eosinophils % 0 (L) 1 - 4 %    Basophils 1 0 - 2 %    Segs Absolute 5.80 1.8 - 7.7 k/uL    Absolute Lymph # 1.00 1.0 - 4.8 k/uL    Absolute Mono # 0.80 0.1 - 1.2 k/uL    Absolute Eos # 0.00 0.0 - 0.4 k/uL    Basophils Absolute 0.00 0.0 - 0.2 k/uL   Comprehensive Metabolic Panel w/ Reflex to MG    Collection Time: 07/10/22  4:54 PM   Result Value Ref Range    Glucose 159 (H) 70 - 99 mg/dL    BUN 12 6 - 20 mg/dL    CREATININE 0.85 0.70 - 1.20 mg/dL    Calcium 8.7 8.6 - 10.4 mg/dL    Sodium 138 135 - 144 mmol/L    Potassium 3.7 3.7 - 5.3 mmol/L    Chloride 102 98 - 107 mmol/L    CO2 24 20 - 31 mmol/L    Anion Gap 12 9 - 17 mmol/L    Alkaline Phosphatase 80 40 - 129 U/L    ALT 33 5 - 41 U/L    AST 20 <40 U/L    Total Bilirubin 0.57 0.3 - 1.2 mg/dL    Total Protein 7.5 6.4 - 8.3 g/dL    Albumin 4.4 3.5 - 5.2 g/dL    Albumin/Globulin Ratio 1.4 1.0 - 2.5    GFR Non-African American >60 >60 mL/min    GFR African American >60 >60 mL/min    GFR Comment         Lipase Collection Time: 07/10/22  4:54 PM   Result Value Ref Range    Lipase 17 13 - 60 U/L   Lactic Acid    Collection Time: 07/10/22  4:54 PM   Result Value Ref Range    Lactic Acid 1.7 0.5 - 2.2 mmol/L   Urinalysis with Reflex to Culture    Collection Time: 07/10/22  5:40 PM    Specimen: Urine   Result Value Ref Range    Color, UA Yellow Yellow    Turbidity UA Clear Clear    Glucose, Ur NEGATIVE NEGATIVE    Bilirubin Urine NEGATIVE NEGATIVE    Ketones, Urine NEGATIVE NEGATIVE    Specific Gravity, UA 1.030 1.005 - 1.030    Urine Hgb TRACE (A) NEGATIVE    pH, UA 6.0 5.0 - 8.0    Protein, UA TRACE (A) NEGATIVE    Urobilinogen, Urine Normal Normal    Nitrite, Urine NEGATIVE NEGATIVE    Leukocyte Esterase, Urine NEGATIVE NEGATIVE   Microscopic Urinalysis    Collection Time: 07/10/22  5:40 PM   Result Value Ref Range    -          WBC, UA 5 TO 10 0 - 5 /HPF    RBC, UA 5 TO 10 0 - 2 /HPF    Epithelial Cells UA 5 TO 10 0 - 5 /HPF    Bacteria, UA FEW (A) None    Mucus, UA 2+ (A) None    Other Observations UA (A) NOT REQ. Utilizing a urinalysis as the only screening method to exclude a potential uropathogen can be unreliable in many patient populations. Rapid screening tests are less sensitive than culture and if UTI is a clinical possibility, culture should be considered despite a negative urinalysis. Culture, Blood 1    Collection Time: 07/10/22  6:02 PM    Specimen: Blood   Result Value Ref Range    Specimen Description . BLOOD     Special Requests L AC GREEN 7ML ORANGE 7ML     Culture NO GROWTH 12 HOURS    Culture, Blood 1    Collection Time: 07/10/22  6:22 PM    Specimen: Blood   Result Value Ref Range    Specimen Description . BLOOD     Special Requests L AC ORANGE 6ML GREEN 7ML     Culture NO GROWTH 12 HOURS    Basic Metabolic Panel w/ Reflex to MG    Collection Time: 07/11/22  5:33 AM   Result Value Ref Range    Glucose 103 (H) 70 - 99 mg/dL    BUN 11 6 - 20 mg/dL    CREATININE 0.84 0.70 - 1.20 mg/dL    Calcium 8.5 (L) 8.6 - 10.4 mg/dL    Sodium 140 135 - 144 mmol/L    Potassium 3.8 3.7 - 5.3 mmol/L    Chloride 104 98 - 107 mmol/L    CO2 27 20 - 31 mmol/L    Anion Gap 9 9 - 17 mmol/L    GFR Non-African American >60 >60 mL/min    GFR African American >60 >60 mL/min    GFR Comment         CBC with Auto Differential    Collection Time: 07/11/22  5:33 AM   Result Value Ref Range    WBC 6.9 3.5 - 11.0 k/uL    RBC 4.75 4.5 - 5.9 m/uL    Hemoglobin 15.0 13.5 - 17.5 g/dL    Hematocrit 43.2 41 - 53 %    MCV 91.0 80 - 100 fL    MCH 31.6 26 - 34 pg    MCHC 34.7 31 - 37 g/dL    RDW 12.9 12.5 - 15.4 %    Platelets 460 (L) 323 - 450 k/uL    MPV 8.2 6.0 - 12.0 fL    Seg Neutrophils 66 36 - 66 %    Lymphocytes 21 (L) 24 - 44 %    Monocytes 13 (H) 2 - 11 %    Eosinophils % 0 (L) 1 - 4 %    Basophils 0 0 - 2 %    Segs Absolute 4.50 1.8 - 7.7 k/uL    Absolute Lymph # 1.40 1.0 - 4.8 k/uL    Absolute Mono # 0.90 0.1 - 1.2 k/uL    Absolute Eos # 0.00 0.0 - 0.4 k/uL    Basophils Absolute 0.00 0.0 - 0.2 k/uL       Imaging/Diagnostics:  CT ABDOMEN PELVIS W IV CONTRAST Additional Contrast? None    Result Date: 7/10/2022  Acute diverticulitis of the distal descending/proximal sigmoid colon, complicated by perforation, with scattered foci of intraperitoneal free air. No well-defined drainable abscess is seen. Hepatic steatosis. Assessment :      Hospital Problems           Last Modified POA    * (Principal) Perforated diverticulum 7/10/2022 Yes          Plan:     Patient status inpatient in the Progressive Unit/Step down    1. Acute diverticulitis with perforation  1. IV fluids  2. Antibiotics  3. Pain control  4. Okay for ice chips  5. Antiemetics  6.  Patient understands that if necessary should his symptoms acutely worsen he may require surgical intervention however goal is to allow his acute illness to resolve and undergo colonoscopy and elective resection in the future    Consultations:   [facility]    Patient is admitted

## 2022-07-11 NOTE — PROGRESS NOTES
[address] [facility] General Surgery Progress Note            PATIENT NAME: Tadeo Contreras     TODAY'S DATE: 2022, 4:08 PM    Chief Complaint   Patient presents with    Abdominal Pain     LLQ pain radiates to rightside, started friday night, vomited in shower that night but now intermittent nausea/vomiting, took tylenol and had full BM  morning but has had some stool passed, denies blood in stool        SUBJECTIVE:    Pt seen and examined. No acute events overnight. Abd pain somewhat improved, passing flatus no BM. No other complaints. OBJECTIVE:   Vitals:  /70   Pulse 81   Temp 98.2 °F (36.8 °C) (Oral)   Resp 14   Ht 5' 8\" (1.727 m)   Wt 199 lb 15.3 oz (90.7 kg)   SpO2 96%   BMI 30.40 kg/m²    TEMPERATURE:  Current - Temp: 98.2 °F (36.8 °C); Max - Temp  Av.8 °F (37.1 °C)  Min: 98.2 °F (36.8 °C)  Max: 100.6 °F (38.1 °C)    INTAKE/OUTPUT:    No intake or output data in the 24 hours ending 22 1608              General: AOx3, NAD  Lungs: Symmetrical chest rise bilaterally, no audible wheezes or rhonchi  Heart: S1S2  Abdomen: Soft, stable tenderness of the LLQ  Extremity: moves all extremities x4, No edema      Data Review:  CBC:   Recent Labs     07/10/22  1654 22  0533   WBC 7.7 6.9   HGB 16.2 15.0    139*     BMP:    Recent Labs     07/10/22  1654 22  0533    140   K 3.7 3.8    104   CO2 24 27   BUN 12 11   CREATININE 0.85 0.84   GLUCOSE 159* 103*     Hepatic:   Recent Labs     07/10/22  1654   AST 20   ALT 33   ALKPHOS 80   BILITOT 0.57     Coagulation:   Recent Labs     07/10/22  1654   PROT 7.5             ASSESSMENT     Patient Active Problem List   Diagnosis    Perforated diverticulum         PLAN    1. Trial CLD today, monitor abdominal pain.    2. Supportive care    Electronically signed by Yisel Espinal DO on 2022 at 4:09 PM

## 2022-07-11 NOTE — CARE COORDINATION
07/11/22 1514   Service Assessment   Patient Orientation Alert and Oriented   Cognition Alert   History Provided By Patient   Primary [address] [address] is: Legal Next of Kin   Prior Functional Level Independent in ADLs/IADLs   Current Functional Level Independent in ADLs/IADLs   Social/Functional History   Type of Home House   ADL Assistance Independent   Homemaking Assistance Independent   Ambulation Assistance Independent   Transfer Assistance Independent   Active  Yes   Discharge Planning   Type of [address] Prior To Admission None   DME Ordered?  No   Type of Home Care Services None   Patient expects to be discharged to: House     D/c home independent

## 2022-07-12 VITALS
RESPIRATION RATE: 16 BRPM | HEART RATE: 67 BPM | SYSTOLIC BLOOD PRESSURE: 117 MMHG | BODY MASS INDEX: 29.74 KG/M2 | DIASTOLIC BLOOD PRESSURE: 83 MMHG | TEMPERATURE: 97.2 F | HEIGHT: 68 IN | WEIGHT: 196.21 LBS | OXYGEN SATURATION: 98 %

## 2022-07-12 PROCEDURE — 99232 SBSQ HOSP IP/OBS MODERATE 35: CPT | Performed by: INTERNAL MEDICINE

## 2022-07-12 PROCEDURE — 2580000003 HC RX 258: Performed by: NURSE PRACTITIONER

## 2022-07-12 PROCEDURE — 6360000002 HC RX W HCPCS: Performed by: NURSE PRACTITIONER

## 2022-07-12 PROCEDURE — 2500000003 HC RX 250 WO HCPCS: Performed by: HOSPITALIST

## 2022-07-12 PROCEDURE — 93005 ELECTROCARDIOGRAM TRACING: CPT | Performed by: INTERNAL MEDICINE

## 2022-07-12 PROCEDURE — 2580000003 HC RX 258: Performed by: HOSPITALIST

## 2022-07-12 RX ORDER — CIPROFLOXACIN 500 MG/1
500 TABLET, FILM COATED ORAL 2 TIMES DAILY
Qty: 20 TABLET | Refills: 0 | Status: SHIPPED | OUTPATIENT
Start: 2022-07-12 | End: 2022-07-22

## 2022-07-12 RX ORDER — METRONIDAZOLE 500 MG/1
500 TABLET ORAL 3 TIMES DAILY
Qty: 30 TABLET | Refills: 0 | Status: SHIPPED | OUTPATIENT
Start: 2022-07-12 | End: 2022-07-22

## 2022-07-12 RX ADMIN — METRONIDAZOLE 500 MG: 500 INJECTION, SOLUTION INTRAVENOUS at 00:29

## 2022-07-12 RX ADMIN — ENOXAPARIN SODIUM 40 MG: 100 INJECTION SUBCUTANEOUS at 09:00

## 2022-07-12 RX ADMIN — HYDROMORPHONE HYDROCHLORIDE 1 MG: 1 INJECTION, SOLUTION INTRAMUSCULAR; INTRAVENOUS; SUBCUTANEOUS at 09:14

## 2022-07-12 RX ADMIN — METRONIDAZOLE 500 MG: 500 INJECTION, SOLUTION INTRAVENOUS at 09:02

## 2022-07-12 RX ADMIN — SODIUM CHLORIDE: 9 INJECTION, SOLUTION INTRAVENOUS at 00:28

## 2022-07-12 RX ADMIN — SODIUM CHLORIDE, PRESERVATIVE FREE 10 ML: 5 INJECTION INTRAVENOUS at 09:03

## 2022-07-12 ASSESSMENT — PAIN DESCRIPTION - LOCATION: LOCATION: ABDOMEN

## 2022-07-12 ASSESSMENT — PAIN SCALES - GENERAL
PAINLEVEL_OUTOF10: 0
PAINLEVEL_OUTOF10: 8
PAINLEVEL_OUTOF10: 0
PAINLEVEL_OUTOF10: 0

## 2022-07-12 ASSESSMENT — PAIN SCALES - WONG BAKER
WONGBAKER_NUMERICALRESPONSE: 0
WONGBAKER_NUMERICALRESPONSE: 0

## 2022-07-12 ASSESSMENT — PAIN DESCRIPTION - DESCRIPTORS: DESCRIPTORS: STABBING

## 2022-07-12 ASSESSMENT — PAIN DESCRIPTION - ORIENTATION: ORIENTATION: LEFT;LOWER

## 2022-07-12 ASSESSMENT — PAIN - FUNCTIONAL ASSESSMENT: PAIN_FUNCTIONAL_ASSESSMENT: ACTIVITIES ARE NOT PREVENTED

## 2022-07-12 NOTE — PROGRESS NOTES
Per Dr. Nicholas Bedolla, patient may discharge. Notified Dr. Kareem Yoder of this via perfect serve.

## 2022-07-12 NOTE — PROGRESS NOTES
[facility]  Office: [address], DO, Tom Adamsne, DO, Mary Kay Durano, DO, Sangitarositaankit Soliser Blood, DO, Ramon Velez MD, Forrest Mccracken MD, Sparkle Oneil MD, Shannan Clark MD, Aggie Cotton MD, Candice Eid MD, Glo Moritz, MD, Cheikh Renner, DO, Karen Bey MD,  Eufemia Galarza, DO, Burt Buckley MD, Ty Pacheco MD, Duke Trejo, DO, Doyne Apgar, MD, Rick Wiggins MD, Juan Plunkett, DO, Mirella Serrano MD, Eduardo Garcia MD, Arely Jeronimo, [facility], [facility], [facility], Jimenez Chauhan, CNP, Anni Latham, CNP, Gail Peralta, CNP, Damari Singh, CNP, Zane العلي PA-C, Chi Acharya, [facility], Zoe Seo, CNP, Colonel Ruano, CNP, Amy Betancourt, CNP, Aris Xie, CNS, Jorge Rojas, [facility], Johan Donnelly, CNP, Jessy Mendez, CNP, Arianna Sandoval, [facility]   [address]    Discharge Summary     Patient ID: Dorys Ybarra  :  1980   MRN: [id number]     ACCOUNT:  [de-identified]   Patient's PCP: Rick Mena MD  Admit Date: 7/10/2022   Discharge Date: 2022   Length of Stay: 2  Code Status:  Full Code  Admitting Physician: Glo Moritz, MD  Discharge Physician: Geno Alberto MD     Active Discharge Diagnoses:     Hospital Problem Lists:  Principal Problem:    Perforated diverticulum  Resolved Problems:    * No resolved hospital problems. *        Discharged Condition: stable    Hospital Stay:     Hospital Course: This is 38years old gentleman who came into the hospital because of abdominal pain. The pain has been going on for some time. Patient underwent CT scan in the emergency department which showed a perforated diverticulitis. Patient was admitted. He was started on Levaquin and Flagyl. General surgery evaluated the patient. They recommended conservative management. His diet was slowly advanced. Patient tolerated the diet.   General surgery recommended patient can be discharged home and they care.

## 2022-07-12 NOTE — PROGRESS NOTES
Pt alert, oriented and medially stable for discharge. Pt verbalized understanding of discharge instructions, new medications, side effects as well as follow up appointments. Pt iv removed. Pt wheeled down to main entrance where family was waiting for pt .

## 2022-07-12 NOTE — PLAN OF CARE
Problem: Discharge Planning  Goal: Discharge to home or other facility with appropriate resources  Outcome: Progressing  Flowsheets (Taken 7/12/2022 0906)  Discharge to home or other facility with appropriate resources: Identify barriers to discharge with patient and caregiver   Pt to discharge home once medically stable. Problem: Pain  Goal: Verbalizes/displays adequate comfort level or baseline comfort level  Outcome: Progressing  Flowsheets (Taken 7/12/2022 0743)  Verbalizes/displays adequate comfort level or baseline comfort level: Encourage patient to monitor pain and request assistance   Pt rated pain as high as 8 this shift. Prn dilaudid effective. Problem: Safety - Adult  Goal: Free from fall injury  Outcome: Progressing   No injury noted this shift. Problem: ABCDS Injury Assessment  Goal: Absence of physical injury  Outcome: Progressing   No injury noted this shift.

## 2022-07-12 NOTE — PROGRESS NOTES
New meedications for discharge (cipro and flagyl) called into [address] [place] [address] outpatient pharmacy closed. Pt only goes to [address] [place] [address].

## 2022-07-13 LAB
EKG ATRIAL RATE: 71 BPM
EKG P AXIS: 0 DEGREES
EKG P-R INTERVAL: 170 MS
EKG Q-T INTERVAL: 356 MS
EKG QRS DURATION: 82 MS
EKG QTC CALCULATION (BAZETT): 386 MS
EKG R AXIS: 0 DEGREES
EKG T AXIS: -7 DEGREES
EKG VENTRICULAR RATE: 71 BPM

## 2022-07-13 NOTE — DISCHARGE SUMMARY
[facility]  Office: [address], DO, Tom Adamsne, DO, Mary Kay Durano, DO, Sangitarositaankit Soliser Blood, DO, Ramon Velez MD, Forrest Mccracken MD, Sparkle Oneil MD, Shannan Clark MD, Aggie Cotton MD, Claire Chandler MD, Glo Moritz, MD, Cheikh Renner, DO, Karen Bey MD,  Eufemia Galarza, DO, Burt Buckley MD, Ty Pacheco MD, Duke Trejo, DO, Doyne Apgar, MD, Rick Wiggins MD, Juan Plunkett, DO, Mirella Serrano MD, Eduardo Garcia MD, Arely Jeronimo, [facility], [facility], [facility], Jimenez Chauhan, CNP, Anni Latham, CNP, Gail Peralta, CNP, Damari Singh, CNP, Zane العلي PA-C, Chi Acharya, [facility], Zoe Seo, CNP, Colonel Ruano, CNP, Amy Betancourt, CNP, Aris Xie, CNS, Jorge Rojas, [facility], Johan Donnelly, CNP, Jessy Mendez, CNP, Arianna Sandoval, [facility]   [address]    Discharge Summary     Patient ID: Dorys Ybarra  :  1980   MRN: [id number]     ACCOUNT:  [de-identified]   Patient's PCP: Rick Mena MD  Admit Date: 7/10/2022   Discharge Date: 2022  Length of Stay: 2  Code Status:  Prior  Admitting Physician: Glo Moritz, MD  Discharge Physician: Geno Alberto MD     Active Discharge Diagnoses:     Hospital Problem Lists:  Principal Problem:    Perforated diverticulum  Resolved Problems:    * No resolved hospital problems. *        Discharged Condition: stable    Hospital Stay:     Hospital Course: This is 38years old gentleman who came into the hospital because of abdominal pain. The pain has been going on for some time. Patient underwent CT scan in the emergency department which showed a perforated diverticulitis. Patient was admitted. He was started on Levaquin and Flagyl. General surgery evaluated the patient. They recommended conservative management. His diet was slowly advanced. Patient tolerated the diet.   General surgery recommended patient can be discharged home and they will follow him up as an outpatient. I also discussed with patient that he needs to follow-up outpatient for a colonoscopy once this diverticular episode has resolved probably in 2-3 months or so. Patient did not have any more pain. He denied any shortness of breath or chest pains on rest or exertion. His EKG was done to check for QTC and which was unremarkable for any prolonged QT. Patient was advised to follow-up with PCP in 1 week to get another EKG. Patient was advised to return if his abdominal pain comes back or if there is any other concern.       Review of systems: All systems were reviewed and found to be negative except for those mentioned elsewhere in the note    Physical examination    Respiratory exam: Bilateral air entry no rhonchi or wheezes  Cardiovascular examination: S1, S2  Abdominal examination: Soft, nontender, bowel sounds present  Extremities: nontender, no edema    Significant therapeutic interventions: As above    Significant Diagnostic Studies:   Labs / Micro:  CBC:   Lab Results   Component Value Date/Time    WBC 6.9 07/11/2022 05:33 AM    RBC 4.75 07/11/2022 05:33 AM    HGB 15.0 07/11/2022 05:33 AM    HCT 43.2 07/11/2022 05:33 AM    MCV 91.0 07/11/2022 05:33 AM    MCH 31.6 07/11/2022 05:33 AM    MCHC 34.7 07/11/2022 05:33 AM    RDW 12.9 07/11/2022 05:33 AM     07/11/2022 05:33 AM     BMP:    Lab Results   Component Value Date/Time    GLUCOSE 103 07/11/2022 05:33 AM     07/11/2022 05:33 AM    K 3.8 07/11/2022 05:33 AM     07/11/2022 05:33 AM    CO2 27 07/11/2022 05:33 AM    ANIONGAP 9 07/11/2022 05:33 AM    BUN 11 07/11/2022 05:33 AM    CREATININE 0.84 07/11/2022 05:33 AM    CALCIUM 8.5 07/11/2022 05:33 AM    LABGLOM >60 07/11/2022 05:33 AM    GFRAA >60 07/11/2022 05:33 AM    GFR      07/11/2022 05:33 AM        Radiology:  CT ABDOMEN PELVIS W IV CONTRAST Additional Contrast? None    Result Date: 7/11/2022  Acute diverticulitis of the distal descending/proximal sigmoid colon, complicated by perforation, with scattered foci of intraperitoneal free air. No well-defined drainable abscess is seen. Hepatic steatosis. Consultations:    Consults:     Final Specialist Recommendations/Findings:   IP CONSULT TO GENERAL SURGERY      The patient was seen and examined on day of discharge and this discharge summary is in conjunction with any daily progress note from day of discharge. Discharge plan:     Disposition: Home    Physician Follow Up:     Deacon Dozier MD  [address] [address] [facility]  [phone]    In 1 week      [address] [address] 41 12    In 1 week         Requiring Further Evaluation/Follow Up POST HOSPITALIZATION/Incidental Findings: PCP to follow-up for repeat EKG    Diet:  As per general surgery    Activity: As tolerated    Instructions to Patient: Follow-up in 2-3 months for colonoscopy    Discharge Medications:      Medication List        START taking these medications      ciprofloxacin 500 MG tablet  Commonly known as: CIPRO  Take 1 tablet by mouth 2 times daily for 10 days     metroNIDAZOLE 500 MG tablet  Commonly known as: FLAGYL  Take 1 tablet by mouth 3 times daily for 10 days               Where to Get Your Medications        These [facility] [address] [place] [address], [address] [phone] - [address] [facility] [address] [place] [address]      Phone: [phone]   ciprofloxacin 500 MG tablet  metroNIDAZOLE 500 MG tablet         No discharge procedures on file. Time Spent on discharge is  28 mins in patient examination, evaluation, counseling as well as medication reconciliation, prescriptions for required medications, discharge plan and follow up.     Electronically signed by   Cassandra Hylton MD  7/13/2022  1:25 PM      Thank you Dr. Deacon Dozier MD for the opportunity to be involved in this patient's care.

## 2022-07-15 LAB
CULTURE: NORMAL
CULTURE: NORMAL
Lab: NORMAL
Lab: NORMAL
SPECIMEN DESCRIPTION: NORMAL
SPECIMEN DESCRIPTION: NORMAL

## 2022-07-18 ENCOUNTER — OFFICE VISIT (OUTPATIENT)
Dept: SURGERY | Age: 42
End: 2022-07-18
Payer: COMMERCIAL

## 2022-07-18 VITALS
RESPIRATION RATE: 16 BRPM | HEIGHT: 68 IN | HEART RATE: 90 BPM | BODY MASS INDEX: 29.7 KG/M2 | WEIGHT: 196 LBS | OXYGEN SATURATION: 100 %

## 2022-07-18 DIAGNOSIS — K57.80 PERFORATED DIVERTICULUM: ICD-10-CM

## 2022-07-18 DIAGNOSIS — Z80.0 FAMILY HISTORY OF COLON CANCER IN FATHER: Primary | ICD-10-CM

## 2022-07-18 PROCEDURE — 99213 OFFICE O/P EST LOW 20 MIN: CPT | Performed by: SURGERY

## 2022-07-18 NOTE — LETTER
[facility]  Surgical Specialties - General Surgery  [address]  [facility], [facility] [address]  Phone: [phone]  Fax: [phone]      22    Patient: Toño García  MRN: [id number]  : 1980  Date of visit: 2022    Dear Mitch Martin MD:      I saw Toño García in post admission follow up for perforated left colon diverticulitis. Given his family history of colon cancer, we will proceed to perform colonoscopy in approximately 5 weeks. We did discuss elective sigmoid colectomy for his diverticular disease, Julissa Callahan is strongly considering proceeding with this surgery, our plan is to continue this discussion once colonoscopy is complete. Below are the relevant portions of my assessment and plan of care. If you have questions, please do not hesitate to call me. I look forward to following Toño García along with you.     Sincerely,        Denice Astorga, DO

## 2022-07-18 NOTE — LETTER
[facility]  [address] [address] [facility] [address]  Phone: [phone]  Fax: [phone]    Maurizio George DO        July 18, 2022     Patient: Amrit Garcia   YOB: 1980   Date of Visit: 7/18/2022       To Whom It May Concern: It is my medical opinion that Nancy Pedro was restricted from work from July 12 2022 to July 19 2022 due to medical concerns. He is released back to full activity without restrictions starting July 20 2022. .    If you have any questions or concerns, please don't hesitate to call.     Sincerely,        Maurizio George DO

## 2022-07-19 ENCOUNTER — TELEPHONE (OUTPATIENT)
Dept: SURGERY | Age: 42
End: 2022-07-19

## 2022-07-19 NOTE — TELEPHONE ENCOUNTER
7/18/22- Spoke to patient, colonoscopy scheduled at [facility] sofia Hensley, patient confirmed and information gave to patient at clinic visit. Surgery date/time: 9/2/22 at 10:30am  Arrival time: 9am  Prep appt: completed at 7/18/22 clinic visit. Colonoscopy/Sutab instructions reviewed/given to patient. All questions answered and patient verbalized understanding.

## 2022-07-19 NOTE — PROGRESS NOTES
[facility] General Surgery Clinic  Progress Note    PATIENT NAME: Jeramy Whitten Link VISIT DATE: 7/18/2022    SUBJECTIVE:  Lenin Mitchell is a 38 y.o. male who presents to the clinic today for follow up to hospital admission for perforated diverticulitis      OBJECTIVE:    Pulse 90   Resp 16   Ht 5' 8\" (1.727 m)   Wt 196 lb (88.9 kg)   SpO2 100%   BMI 29.80 kg/m²     General Appearance:  awake, alert, no acute distress, well developed, well nourished   Skin:  Skin color, texture, turgor normal. No rashes or lesions. Lungs:  Normal chest expansion, unlabored breathing without accessory muscle use. No audible rales, rhonchi, or wheezing. Cardiovascular: S1S2. No evidence of JVD. No evidence of pulsatile masses in abdomen  Abdomen:  Soft, non-tender, no organomegaly, no masses. Musculoskeletal: No evidence of bony/muscular deformities, trauma, atrophy of either left/right upper/lower extremity. No evidence of digital clubbing or cyanosis. ASSESSMENT:  1. Family history of colon cancer in father    2. Perforated diverticulum          PLAN:  Schedule for screening colonoscopy in ~5weeks  We discussed elective sigmoid colectomy, pt is strongly considering proceeding with this. Will complete colonoscopy first given [facility] [place]. We discussed details of sigmoid colectomy, further discussions to follow.     Electronically signed by Julieta Valdez DO on 7/19/2022 at 9:03 AM

## 2022-08-30 ENCOUNTER — TELEPHONE (OUTPATIENT)
Dept: SURGERY | Age: 42
End: 2022-08-30

## 2022-08-30 RX ORDER — SOD SULF/POT CHLORIDE/MAG SULF 1.479 G
TABLET ORAL
Qty: 24 TABLET | Refills: 0 | Status: ON HOLD | OUTPATIENT
Start: 2022-08-30 | End: 2022-09-02 | Stop reason: HOSPADM

## 2022-09-01 ENCOUNTER — ANESTHESIA EVENT (OUTPATIENT)
Dept: OPERATING ROOM | Age: 42
End: 2022-09-01
Payer: COMMERCIAL

## 2022-09-01 NOTE — DISCHARGE INSTRUCTIONS

## 2022-09-02 ENCOUNTER — ANESTHESIA (OUTPATIENT)
Dept: OPERATING ROOM | Age: 42
End: 2022-09-02
Payer: COMMERCIAL

## 2022-09-02 ENCOUNTER — HOSPITAL ENCOUNTER (OUTPATIENT)
Age: 42
Setting detail: OUTPATIENT SURGERY
Discharge: HOME | End: 2022-09-02
Attending: SURGERY | Admitting: SURGERY
Payer: COMMERCIAL

## 2022-09-02 VITALS
RESPIRATION RATE: 14 BRPM | SYSTOLIC BLOOD PRESSURE: 115 MMHG | WEIGHT: 195 LBS | HEIGHT: 68 IN | BODY MASS INDEX: 29.55 KG/M2 | TEMPERATURE: 97.1 F | OXYGEN SATURATION: 99 % | DIASTOLIC BLOOD PRESSURE: 81 MMHG | HEART RATE: 59 BPM

## 2022-09-02 DIAGNOSIS — Z12.11 SCREENING FOR COLON CANCER: ICD-10-CM

## 2022-09-02 PROCEDURE — 2500000003 HC RX 250 WO HCPCS

## 2022-09-02 PROCEDURE — 7100000011 HC PHASE II RECOVERY - ADDTL 15 MIN: Performed by: SURGERY

## 2022-09-02 PROCEDURE — 2580000003 HC RX 258: Performed by: ANESTHESIOLOGY

## 2022-09-02 PROCEDURE — 3700000000 HC ANESTHESIA ATTENDED CARE: Performed by: SURGERY

## 2022-09-02 PROCEDURE — 6360000002 HC RX W HCPCS

## 2022-09-02 PROCEDURE — 3700000001 HC ADD 15 MINUTES (ANESTHESIA): Performed by: SURGERY

## 2022-09-02 PROCEDURE — 45385 COLONOSCOPY W/LESION REMOVAL: CPT | Performed by: SURGERY

## 2022-09-02 PROCEDURE — 2709999900 HC NON-CHARGEABLE SUPPLY: Performed by: SURGERY

## 2022-09-02 PROCEDURE — 3609010400 HC COLONOSCOPY POLYPECTOMY HOT BIOPSY: Performed by: SURGERY

## 2022-09-02 PROCEDURE — 88305 TISSUE EXAM BY PATHOLOGIST: CPT

## 2022-09-02 PROCEDURE — 7100000010 HC PHASE II RECOVERY - FIRST 15 MIN: Performed by: SURGERY

## 2022-09-02 RX ORDER — SODIUM CHLORIDE 0.9 % (FLUSH) 0.9 %
5-40 SYRINGE (ML) INJECTION PRN
Status: DISCONTINUED | OUTPATIENT
Start: 2022-09-02 | End: 2022-09-02 | Stop reason: HOSPADM

## 2022-09-02 RX ORDER — ONDANSETRON 2 MG/ML
4 INJECTION INTRAMUSCULAR; INTRAVENOUS
Status: DISCONTINUED | OUTPATIENT
Start: 2022-09-02 | End: 2022-09-02 | Stop reason: HOSPADM

## 2022-09-02 RX ORDER — SODIUM CHLORIDE, SODIUM LACTATE, POTASSIUM CHLORIDE, CALCIUM CHLORIDE 600; 310; 30; 20 MG/100ML; MG/100ML; MG/100ML; MG/100ML
INJECTION, SOLUTION INTRAVENOUS CONTINUOUS
Status: DISCONTINUED | OUTPATIENT
Start: 2022-09-02 | End: 2022-09-02 | Stop reason: HOSPADM

## 2022-09-02 RX ORDER — MEPERIDINE HYDROCHLORIDE 50 MG/ML
12.5 INJECTION INTRAMUSCULAR; INTRAVENOUS; SUBCUTANEOUS ONCE
Status: DISCONTINUED | OUTPATIENT
Start: 2022-09-02 | End: 2022-09-02 | Stop reason: HOSPADM

## 2022-09-02 RX ORDER — LIDOCAINE HYDROCHLORIDE 10 MG/ML
1 INJECTION, SOLUTION EPIDURAL; INFILTRATION; INTRACAUDAL; PERINEURAL
Status: DISCONTINUED | OUTPATIENT
Start: 2022-09-02 | End: 2022-09-02 | Stop reason: HOSPADM

## 2022-09-02 RX ORDER — SODIUM CHLORIDE 9 MG/ML
INJECTION, SOLUTION INTRAVENOUS PRN
Status: DISCONTINUED | OUTPATIENT
Start: 2022-09-02 | End: 2022-09-02 | Stop reason: HOSPADM

## 2022-09-02 RX ORDER — SODIUM CHLORIDE 0.9 % (FLUSH) 0.9 %
5-40 SYRINGE (ML) INJECTION EVERY 12 HOURS SCHEDULED
Status: DISCONTINUED | OUTPATIENT
Start: 2022-09-02 | End: 2022-09-02 | Stop reason: HOSPADM

## 2022-09-02 RX ORDER — PROPOFOL 10 MG/ML
INJECTION, EMULSION INTRAVENOUS PRN
Status: DISCONTINUED | OUTPATIENT
Start: 2022-09-02 | End: 2022-09-02 | Stop reason: SDUPTHER

## 2022-09-02 RX ORDER — LIDOCAINE HYDROCHLORIDE 10 MG/ML
INJECTION, SOLUTION INFILTRATION; PERINEURAL PRN
Status: DISCONTINUED | OUTPATIENT
Start: 2022-09-02 | End: 2022-09-02 | Stop reason: SDUPTHER

## 2022-09-02 RX ORDER — SODIUM CHLORIDE 9 MG/ML
25 INJECTION, SOLUTION INTRAVENOUS PRN
Status: DISCONTINUED | OUTPATIENT
Start: 2022-09-02 | End: 2022-09-02 | Stop reason: HOSPADM

## 2022-09-02 RX ORDER — DIPHENHYDRAMINE HYDROCHLORIDE 50 MG/ML
12.5 INJECTION INTRAMUSCULAR; INTRAVENOUS
Status: DISCONTINUED | OUTPATIENT
Start: 2022-09-02 | End: 2022-09-02 | Stop reason: HOSPADM

## 2022-09-02 RX ORDER — MORPHINE SULFATE 2 MG/ML
1 INJECTION, SOLUTION INTRAMUSCULAR; INTRAVENOUS EVERY 5 MIN PRN
Status: DISCONTINUED | OUTPATIENT
Start: 2022-09-02 | End: 2022-09-02 | Stop reason: HOSPADM

## 2022-09-02 RX ADMIN — PROPOFOL 220 MG: 10 INJECTION, EMULSION INTRAVENOUS at 10:55

## 2022-09-02 RX ADMIN — SODIUM CHLORIDE, POTASSIUM CHLORIDE, SODIUM LACTATE AND CALCIUM CHLORIDE: 600; 310; 30; 20 INJECTION, SOLUTION INTRAVENOUS at 10:26

## 2022-09-02 RX ADMIN — LIDOCAINE HYDROCHLORIDE 40 MG: 10 INJECTION, SOLUTION INFILTRATION; PERINEURAL at 10:55

## 2022-09-02 ASSESSMENT — PAIN - FUNCTIONAL ASSESSMENT: PAIN_FUNCTIONAL_ASSESSMENT: 0-10

## 2022-09-02 ASSESSMENT — PAIN SCALES - GENERAL
PAINLEVEL_OUTOF10: 0
PAINLEVEL_OUTOF10: 0

## 2022-09-02 NOTE — ANESTHESIA PRE PROCEDURE
07/11/2022 05:33 AM     07/11/2022 05:33 AM    CO2 27 07/11/2022 05:33 AM    BUN 11 07/11/2022 05:33 AM    CREATININE 0.84 07/11/2022 05:33 AM    GFRAA >60 07/11/2022 05:33 AM    LABGLOM >60 07/11/2022 05:33 AM    GLUCOSE 103 07/11/2022 05:33 AM    PROT 7.5 07/10/2022 04:54 PM    CALCIUM 8.5 07/11/2022 05:33 AM    BILITOT 0.57 07/10/2022 04:54 PM    ALKPHOS 80 07/10/2022 04:54 PM    AST 20 07/10/2022 04:54 PM    ALT 33 07/10/2022 04:54 PM       POC Tests: No results for input(s): POCGLU, POCNA, POCK, POCCL, POCBUN, POCHEMO, POCHCT in the last 72 hours. Coags: No results found for: PROTIME, INR, APTT    HCG (If Applicable): No results found for: PREGTESTUR, PREGSERUM, HCG, HCGQUANT     ABGs: No results found for: PHART, PO2ART, GMS6SEN, WHC3XVZ, BEART, N5FYTOAD     Type & Screen (If Applicable):  No results found for: LABABO, LABRH    Drug/Infectious Status (If Applicable):  No results found for: HIV, HEPCAB    COVID-19 Screening (If Applicable): No results found for: COVID19        Anesthesia Evaluation  Patient summary reviewed and Nursing notes reviewed no history of anesthetic complications:   Airway: Mallampati: II  TM distance: >3 FB   Neck ROM: full  Mouth opening: > = 3 FB   Dental: normal exam         Pulmonary:Negative Pulmonary ROS and normal exam  breath sounds clear to auscultation                             Cardiovascular:Negative CV ROS            Rhythm: regular  Rate: normal                    Neuro/Psych:   Negative Neuro/Psych ROS              GI/Hepatic/Renal:   (+) bowel prep,           Endo/Other: Negative Endo/Other ROS                    Abdominal:       Abdomen: soft. Vascular: negative vascular ROS. Other Findings:           Anesthesia Plan      MAC     ASA 1             Anesthetic plan and risks discussed with patient. Plan discussed with CRNA.                     Dewayne Salazar MD   9/2/2022

## 2022-09-02 NOTE — OP NOTE
Operative Note      Patient: Mello Gill  YOB: 1980  MRN: [id number]    Date of Procedure: 9/2/2022    Pre-Op Diagnosis: Screening for colon cancer [Z12.11]    Post-Op Diagnosis:   Descending colon polyp 6mm  Left colon diverticulosis  Internal hemorrhoids       Procedure(s):  COLONOSCOPY POLYPECTOMY HOT BIOPSY    Surgeon(s):  Julita Garcia DO    Assistant:   * No surgical staff found *    Anesthesia: Monitor Anesthesia Care    Estimated Blood Loss (mL): Minimal    Complications: None    Specimens:   ID Type Source Tests Collected by Time Destination   A : DESCENDING COLON POLYP Tissue Colon-Descending SURGICAL PATHOLOGY Julita Garcia DO 9/2/2022 1104        Implants:  * No implants in log *      Drains: * No LDAs found *    Findings: as above    Detailed Description of Procedure:       INDICATIONS FOR PROCEDURE:   The patient is a 38y.o. year old male with history of above preop diagnosis. Colonoscopy with possible biopsy or polypectomy was recommend, the risk, benefits, expected outcome, and alternatives to the procedure were explained. Risks included but are not limited to bleeding, infection, respiratory distress, hypotension, and perforation of the colon. The patient understands and is in agreement. PROCEDURE DETAILS:  Patient was brought to the endoscopy suite and placed in the left lateral recumbent position. A time out was completed verifying correct patient, procedure and equipment. Anesthesia was induced using MAC guidelines. A digital rectal exam was performed. The colonoscope was inserted into the rectum without difficulty and the scope was advanced to the cecum which was identified by anatomy and by palpation. Bowel prep was adequate. The scope was slowly withdrawn visualizing the cecum, ascending colon, transverse colon, descending colon, sigmoid colon and rectum. The scope was withdrawn to the rectal vault and then retroflexed. The scope was then straightened and removed.  The patient tolerated the procedure well and was transferred to the recovery unit in stable condition. Findings:    Polyps:   6mm polyp distal descending colon removed by hot snare    Other findings:   Internal hemorrhoids  Left colon diverticulosis without complication      Greater than 6 minutes was spent withdrawing the colonoscope.       IMPRESSION/PLAN:   Increase dietary fiber and water  Patient is advised to have a repeat colonoscopy in 5 years unless otherwise dictated by pathology  Colonoscopy sooner if blood in the stool, unexplained weight loss, or change in the bowels        Electronically signed by Belem Brito DO on 9/2/2022 at 11:07 AM

## 2022-09-02 NOTE — ANESTHESIA POSTPROCEDURE EVALUATION
POST- ANESTHESIA EVALUATION       Pt Name: Alberto Montanez  MRN: [id number]  [place]: 1980  Date of evaluation: 9/2/2022  Time:  11:46 AM      /81   Pulse 59   Temp 97.1 °F (36.2 °C) (Temporal)   Resp 14   Ht 5' 8\" (1.727 m)   Wt 195 lb (88.5 kg)   SpO2 99%   BMI 29.65 kg/m²      Consciousness Level  Awake  Cardiopulmonary Status  Stable  Pain Adequately Treated YES  Nausea / Vomiting  NO  Adequate Hydration  YES  Anesthesia Related Complications NONE      Electronically signed by Lindsay Morales MD on 9/2/2022 at 11:46 AM       Department of Anesthesiology  Postprocedure Note    Patient: Alberto Montanez  MRN: [id number]  YOB: 1980  Date of evaluation: 9/2/2022      Procedure Summary     Date: 09/02/22 Room / Location: [facility] [address]    Anesthesia Start: 3105 Anesthesia Stop: 1111    Procedure: COLONOSCOPY POLYPECTOMY HOT BIOPSY Diagnosis:       Screening for colon cancer      (Screening for colon cancer [Z12.11])    Surgeons: Td Alvarez DO Responsible Provider: Lindsay Morales MD    Anesthesia Type: MAC ASA Status: 1          Anesthesia Type: MAC    Katiana Phase I: Katiana Score: 10    Katiana Phase II: Katiana Score: 9      Anesthesia Post Evaluation

## 2022-09-02 NOTE — H&P
[address]      Patient's Name/ Date of Birth/ Gender: Monique Sorensen / 1980 (38 y.o.) / male     Attending physician: Raul Esquivel DO    CC: colorectal cancer screening    History of present Illness: Monique Sorensen is a 38 y.o. male, presents today for colonoscopy for colorectal cancer screening. Colonoscopy history: no prior scope, [address] colon CA in father diagnosed <60yrs age      Past Medical History:  has no past medical history on file. Past Surgical History:   Past Surgical History:   Procedure Laterality Date    APPENDECTOMY         Social History:  reports that he has never smoked. He has never used smokeless tobacco. He reports current alcohol use. He reports that he does not use drugs. Family History: family history includes Cancer in his father. Review of Systems:   General: Denies fever, chills, night sweats, weight loss, malaise, fatigue  HEENT: Denies sore throat, sinus problems, allergic rhinosinusitis  Card: Denies chest pain, palpitations, orthopnea/PND. Denies h/o murmurs  Pulm: Denies cough, shortness of breath, BRENNAN  GI:  per HPI; denies history of constipation, diarrhea, hematochezia or melena  : Denies polyuria, dysuria, hematuria  Endo: Denies diabetes, thyroid problems. Heme: Denies anemia, h/o bleeding or clotting problems. Neuro: Denies h/o CVA, TIA  Skin: Denies rashes, ulcers  Musculoskeletal: Denies muscle, joint, back pain. Allergies: Patient has no known allergies. Current Meds:No current facility-administered medications for this encounter.     Physical Exam:  Vital signs and Nurse's note reviewed  Gen:  A&Ox3, NAD  HEENT: NCAT, PERRLA, EOMI, no scleral icterus, oral mucosa moist  Neck: Trachea midline without obvious masses or lesions  Chest: Symmetric rise with inhalation, no evidence of trauma  CVS: S1S2  Resp: Good bilateral air entry, no audible wheeze or rhonchi  Abd: soft, non-tender, non-distended, no hepatosplenomegaly or palpable masses  Ext: No clubbing, cyanosis, edema, peripheral pulses 2+ Rad/Fem/DP/PT  CNS: Moves all extremities, no gross focal motor deficits  Skin: No erythema or ulcerations         Assessment:    Surya Schreiber is a 38 y.o. male presents for colonoscopy for colorectal screening     Plan: To OR for colonoscopy. The risks include bleeding, infection, scarring, perforation, damage to surrounding tissues, need for further surgery in the future. The benefits, alternatives, complications and procedure details were explained to the patient, all questions were answered.           Tunde Edwards,   9/2/2022

## 2022-09-06 LAB — SURGICAL PATHOLOGY REPORT: NORMAL

## (undated) DEVICE — ERBE NESSY®PLATE 170 SPLIT; 168CM²; CABLE 3M: Brand: ERBE

## (undated) DEVICE — GOWN,AURORA,NONRNF,XL,30/CS: Brand: MEDLINE

## (undated) DEVICE — 4-PORT MANIFOLD: Brand: NEPTUNE 2

## (undated) DEVICE — SYRINGE MED 50ML LUERLOCK TIP

## (undated) DEVICE — TUBING, SUCTION, 3/16" X 10', STRAIGHT: Brand: MEDLINE

## (undated) DEVICE — FLUFF UNDERPAD,MODERATE: Brand: WINGS

## (undated) DEVICE — BASIN EMSIS 700ML GRAPHITE PLAS KID SHP GRAD

## (undated) DEVICE — CONNECTOR TBNG AUX H2O JET DISP FOR OLY 160/180 SER

## (undated) DEVICE — ADAPTER,CATHETER/SYRINGE/LUER,STERILE: Brand: MEDLINE

## (undated) DEVICE — POLYP TRAP: Brand: TRAPEASE®

## (undated) DEVICE — SPONGE GZ W4XL4IN RAYON POLY FILL CVR W/ NONWOVEN FAB

## (undated) DEVICE — Device: Brand: DEFENDO VALVE AND CONNECTOR KIT

## (undated) DEVICE — PERRYSBURG ENDO PACK: Brand: MEDLINE INDUSTRIES, INC.

## (undated) DEVICE — SNARE ENDOSCP L240CM SHTH DIA2.4MM LOOP W20MM MIN WRK CHN